# Patient Record
Sex: MALE | Race: WHITE | URBAN - METROPOLITAN AREA
[De-identification: names, ages, dates, MRNs, and addresses within clinical notes are randomized per-mention and may not be internally consistent; named-entity substitution may affect disease eponyms.]

---

## 2020-08-10 ENCOUNTER — OFFICE VISIT (OUTPATIENT)
Dept: URBAN - METROPOLITAN AREA CLINIC 76 | Facility: CLINIC | Age: 70
End: 2020-08-10
Payer: COMMERCIAL

## 2020-08-10 DIAGNOSIS — H10.45 OTHER CHRONIC ALLERGIC CONJUNCTIVITIS: ICD-10-CM

## 2020-08-10 DIAGNOSIS — H52.4 PRESBYOPIA: Primary | ICD-10-CM

## 2020-08-10 DIAGNOSIS — H25.13 AGE-RELATED NUCLEAR CATARACT, BILATERAL: ICD-10-CM

## 2020-08-10 PROCEDURE — 92004 COMPRE OPH EXAM NEW PT 1/>: CPT | Performed by: OPTOMETRIST

## 2020-08-10 RX ORDER — OLOPATADINE HYDROCHLORIDE 1 MG/ML
0.1 % SOLUTION/ DROPS OPHTHALMIC
Qty: 5 | Refills: 6 | Status: ACTIVE
Start: 2020-08-10

## 2020-08-10 ASSESSMENT — KERATOMETRY
OS: 46.50
OD: 47.13

## 2020-08-10 ASSESSMENT — VISUAL ACUITY
OS: 20/30
OD: 20/40

## 2020-08-10 ASSESSMENT — INTRAOCULAR PRESSURE
OS: 19
OD: 18

## 2020-08-10 NOTE — IMPRESSION/PLAN
Impression: Other chronic allergic conjunctivitis: H10.45. Bilateral. Plan: Discussed diagnosis with patient. Recommend OTC oral antihistamine, and Olapatadine 1 drop bid ou, prn. Rub excess into lids. Recommend refrigerating drops and doing cool compresses.

## 2021-12-03 ENCOUNTER — OFFICE VISIT (OUTPATIENT)
Dept: URBAN - METROPOLITAN AREA CLINIC 76 | Facility: CLINIC | Age: 71
End: 2021-12-03
Payer: COMMERCIAL

## 2021-12-03 PROCEDURE — 92012 INTRM OPH EXAM EST PATIENT: CPT | Performed by: OPTOMETRIST

## 2021-12-03 ASSESSMENT — KERATOMETRY
OS: 46.63
OD: 46.75

## 2021-12-03 ASSESSMENT — INTRAOCULAR PRESSURE
OS: 18
OD: 16

## 2021-12-03 ASSESSMENT — VISUAL ACUITY
OS: 20/30
OD: 20/30

## 2021-12-03 NOTE — IMPRESSION/PLAN
Impression: Presbyopia: H52.4. Bilateral. declined dilation. Plan: Discussed condition. New mrx given today. Explained importance of dilation. Pt to call with any concerns.

## 2021-12-03 NOTE — IMPRESSION/PLAN
Impression: Age-related nuclear cataract, bilateral: H25.13. affecting VA OD>OS. Plan: Discussed condition. Continue to monitor without treatment at this time.

## 2023-04-28 ENCOUNTER — OFFICE VISIT (OUTPATIENT)
Dept: URBAN - METROPOLITAN AREA CLINIC 76 | Facility: CLINIC | Age: 73
End: 2023-04-28
Payer: COMMERCIAL

## 2023-04-28 DIAGNOSIS — H52.4 PRESBYOPIA: ICD-10-CM

## 2023-04-28 DIAGNOSIS — H25.13 AGE-RELATED NUCLEAR CATARACT, BILATERAL: ICD-10-CM

## 2023-04-28 DIAGNOSIS — H02.002 ENTROPION OF RIGHT LOWER EYELID: ICD-10-CM

## 2023-04-28 DIAGNOSIS — E11.9 TYPE 2 DIABETES MELLITUS W/O COMPLICATION: Primary | ICD-10-CM

## 2023-04-28 PROCEDURE — 99214 OFFICE O/P EST MOD 30 MIN: CPT | Performed by: OPTOMETRIST

## 2023-04-28 ASSESSMENT — INTRAOCULAR PRESSURE
OS: 16
OD: 16

## 2023-04-28 ASSESSMENT — KERATOMETRY
OD: 46.75
OS: 46.88

## 2023-04-28 ASSESSMENT — VISUAL ACUITY
OS: 20/40
OD: 20/60

## 2023-04-28 NOTE — IMPRESSION/PLAN
Impression: Entropion of right lower eyelid: H02.002. Plan: Discussed will refer to [facility] for a consultation.

## 2023-04-28 NOTE — IMPRESSION/PLAN
Impression: Presbyopia: H52.4. Plan: Discussed. Will have him return for a vision exam only with no dilation.

## 2023-04-28 NOTE — IMPRESSION/PLAN
Impression: Type 2 diabetes mellitus w/o complication: M71.4. Plan: Discussed diagnosis in detail with patient. No treatment is required at this time. Emphasized blood sugar control. Call if [address] worsens. Will continue to observe condition and or symptoms, keep follow ups with primary care for glycemic management. Recommend yearly exams. Letter sent to PCP.

## 2023-09-18 ENCOUNTER — OFFICE VISIT (OUTPATIENT)
Dept: URBAN - METROPOLITAN AREA CLINIC 76 | Facility: CLINIC | Age: 73
End: 2023-09-18
Payer: COMMERCIAL

## 2023-09-18 DIAGNOSIS — H25.13 AGE-RELATED NUCLEAR CATARACT, BILATERAL: Primary | ICD-10-CM

## 2023-09-18 DIAGNOSIS — H02.002 ENTROPION OF RIGHT LOWER EYELID: ICD-10-CM

## 2023-09-18 DIAGNOSIS — E11.9 TYPE 2 DIABETES MELLITUS W/O COMPLICATION: ICD-10-CM

## 2023-09-18 DIAGNOSIS — H52.4 PRESBYOPIA: ICD-10-CM

## 2023-09-18 PROCEDURE — 99214 OFFICE O/P EST MOD 30 MIN: CPT | Performed by: OPTOMETRIST

## 2023-09-18 PROCEDURE — 92015 DETERMINE REFRACTIVE STATE: CPT | Performed by: OPTOMETRIST

## 2023-09-18 ASSESSMENT — VISUAL ACUITY
OS: 20/40
OD: 20/150

## 2023-09-18 ASSESSMENT — KERATOMETRY
OS: 47.88
OD: 47.25

## 2023-09-18 ASSESSMENT — INTRAOCULAR PRESSURE
OD: 17
OS: 17

## 2023-10-10 ENCOUNTER — OFFICE VISIT (OUTPATIENT)
Dept: URBAN - METROPOLITAN AREA CLINIC 76 | Facility: CLINIC | Age: 73
End: 2023-10-10
Payer: COMMERCIAL

## 2023-10-10 DIAGNOSIS — H52.223 REGULAR ASTIGMATISM, BILATERAL: ICD-10-CM

## 2023-10-10 DIAGNOSIS — H02.002 ENTROPION OF RIGHT LOWER EYELID: ICD-10-CM

## 2023-10-10 DIAGNOSIS — H25.13 AGE-RELATED NUCLEAR CATARACT, BILATERAL: Primary | ICD-10-CM

## 2023-10-10 DIAGNOSIS — E11.9 TYPE 2 DIABETES MELLITUS W/O COMPLICATION: ICD-10-CM

## 2023-10-10 PROCEDURE — 99204 OFFICE O/P NEW MOD 45 MIN: CPT | Performed by: STUDENT IN AN ORGANIZED HEALTH CARE EDUCATION/TRAINING PROGRAM

## 2023-10-10 RX ORDER — KETOROLAC TROMETHAMINE 5 MG/ML
0.5 % SOLUTION OPHTHALMIC
Qty: 5 | Refills: 1 | Status: ACTIVE
Start: 2023-10-10

## 2023-10-10 ASSESSMENT — VISUAL ACUITY
OS: 20/40
OD: 20/150

## 2023-10-10 ASSESSMENT — INTRAOCULAR PRESSURE
OS: 15
OD: 15

## 2023-10-25 ENCOUNTER — TECH ONLY (OUTPATIENT)
Dept: URBAN - METROPOLITAN AREA CLINIC 76 | Facility: CLINIC | Age: 73
End: 2023-10-25
Payer: COMMERCIAL

## 2023-10-25 DIAGNOSIS — H25.13 AGE-RELATED NUCLEAR CATARACT, BILATERAL: Primary | ICD-10-CM

## 2023-10-25 ASSESSMENT — PACHYMETRY
OD: 3.17
OD: 21.54
OS: 3.20
OS: 21.54

## 2023-11-06 ENCOUNTER — SURGERY (OUTPATIENT)
Dept: URBAN - METROPOLITAN AREA SURGERY 47 | Facility: SURGERY | Age: 73
End: 2023-11-06
Payer: COMMERCIAL

## 2023-11-06 PROCEDURE — 66984 XCAPSL CTRC RMVL W/O ECP: CPT | Performed by: STUDENT IN AN ORGANIZED HEALTH CARE EDUCATION/TRAINING PROGRAM

## 2023-11-07 ENCOUNTER — POST-OPERATIVE VISIT (OUTPATIENT)
Dept: URBAN - METROPOLITAN AREA CLINIC 76 | Facility: CLINIC | Age: 73
End: 2023-11-07
Payer: COMMERCIAL

## 2023-11-07 DIAGNOSIS — Z48.810 ENCOUNTER FOR SURGICAL AFTERCARE FOLLOWING SURGERY ON A SENSE ORGAN: Primary | ICD-10-CM

## 2023-11-07 PROCEDURE — 99024 POSTOP FOLLOW-UP VISIT: CPT | Performed by: OPTOMETRIST

## 2023-11-07 ASSESSMENT — INTRAOCULAR PRESSURE
OS: 16
OD: 20

## 2023-11-14 PROCEDURE — 99024 POSTOP FOLLOW-UP VISIT: CPT | Performed by: OPTOMETRIST

## 2023-11-20 ENCOUNTER — SURGERY (OUTPATIENT)
Dept: URBAN - METROPOLITAN AREA SURGERY 47 | Facility: SURGERY | Age: 73
End: 2023-11-20
Payer: COMMERCIAL

## 2023-11-20 PROCEDURE — 66984 XCAPSL CTRC RMVL W/O ECP: CPT | Performed by: STUDENT IN AN ORGANIZED HEALTH CARE EDUCATION/TRAINING PROGRAM

## 2023-11-28 ENCOUNTER — POST-OPERATIVE VISIT (OUTPATIENT)
Dept: URBAN - METROPOLITAN AREA CLINIC 76 | Facility: CLINIC | Age: 73
End: 2023-11-28
Payer: COMMERCIAL

## 2023-11-28 DIAGNOSIS — Z96.1 PRESENCE OF INTRAOCULAR LENS: ICD-10-CM

## 2023-11-28 PROCEDURE — 92015 DETERMINE REFRACTIVE STATE: CPT | Performed by: OPTOMETRIST

## 2023-11-28 PROCEDURE — 99024 POSTOP FOLLOW-UP VISIT: CPT | Performed by: OPTOMETRIST

## 2023-11-28 RX ORDER — KETOROLAC TROMETHAMINE 5 MG/ML
0.5 % SOLUTION OPHTHALMIC
Qty: 5 | Refills: 0 | Status: ACTIVE
Start: 2023-11-28

## 2023-11-28 ASSESSMENT — VISUAL ACUITY
OD: 20/20
OS: 20/25

## 2023-11-28 ASSESSMENT — INTRAOCULAR PRESSURE
OD: 14
OS: 13

## 2023-12-19 ENCOUNTER — POST-OPERATIVE VISIT (OUTPATIENT)
Dept: URBAN - METROPOLITAN AREA CLINIC 76 | Facility: CLINIC | Age: 73
End: 2023-12-19
Payer: COMMERCIAL

## 2023-12-19 DIAGNOSIS — H52.4 PRESBYOPIA: ICD-10-CM

## 2023-12-19 PROCEDURE — 99024 POSTOP FOLLOW-UP VISIT: CPT | Performed by: OPTOMETRIST

## 2023-12-19 PROCEDURE — 92015 DETERMINE REFRACTIVE STATE: CPT | Performed by: OPTOMETRIST

## 2023-12-19 ASSESSMENT — INTRAOCULAR PRESSURE
OD: 13
OS: 13

## 2023-12-19 ASSESSMENT — VISUAL ACUITY
OD: 20/20
OS: 20/20

## 2024-07-30 ENCOUNTER — OFFICE VISIT (OUTPATIENT)
Dept: URBAN - METROPOLITAN AREA CLINIC 76 | Facility: CLINIC | Age: 74
End: 2024-07-30
Payer: COMMERCIAL

## 2024-07-30 DIAGNOSIS — H10.45 OTHER CHRONIC ALLERGIC CONJUNCTIVITIS: ICD-10-CM

## 2024-07-30 DIAGNOSIS — H02.002 ENTROPION OF RIGHT LOWER EYELID: ICD-10-CM

## 2024-07-30 DIAGNOSIS — Z96.1 PRESENCE OF INTRAOCULAR LENS: Primary | ICD-10-CM

## 2024-07-30 PROCEDURE — 99213 OFFICE O/P EST LOW 20 MIN: CPT | Performed by: OPTOMETRIST

## 2024-07-30 ASSESSMENT — INTRAOCULAR PRESSURE
OD: 14
OS: 14

## 2024-12-02 ENCOUNTER — OFFICE VISIT (OUTPATIENT)
Dept: URBAN - METROPOLITAN AREA CLINIC 71 | Facility: CLINIC | Age: 74
End: 2024-12-02
Payer: COMMERCIAL

## 2024-12-02 DIAGNOSIS — H02.002 ENTROPION OF RIGHT LOWER EYELID: Primary | ICD-10-CM

## 2024-12-02 PROCEDURE — 99213 OFFICE O/P EST LOW 20 MIN: CPT | Performed by: OPHTHALMOLOGY

## 2024-12-02 PROCEDURE — 92285 EXTERNAL OCULAR PHOTOGRAPHY: CPT | Performed by: OPHTHALMOLOGY

## 2024-12-02 ASSESSMENT — INTRAOCULAR PRESSURE
OD: 9
OS: 9